# Patient Record
(demographics unavailable — no encounter records)

---

## 2025-06-06 NOTE — ASSESSMENT
[FreeTextEntry1] :   PE:   Constitutional: Alert and in no acute distress. Musculoskeletal: FCU tendonitis   Skin is intact. There is tenderness to palpation over the FCU.  There is no malrotation or scissoring of the injured digit. Remainder of the extremity is atraumatic. There is no tenderness over the volar tubercle of the scaphoid or anatomic snuffbox.   Mobility is full about the shoulders and elbows and wrists. Digital flexion and extension is limited on right index finger due to discomfort and swelling.   Sensation is intact to light touch in the ulnar, median, and radial nerve distributions. Motor is intact in the ulnar, median, and radial nerve distributions. Fingers are pink and well perfused. Capillary refill is brisk.   Data: 3 view xray of the # obtained and significant for #   Assessment: Patient is a # y/o # with #.   Procedure: Short arm fiberglass cast applied to the left wrist and hand. Procedure tolerated well.   Plan: - Follow up in 2 weeks for repeat x-rays - No strenuous activities or heavy lifting

## 2025-07-14 NOTE — HISTORY OF PRESENT ILLNESS
[FreeTextEntry1] : Patient presents today for evaluation of left wrist pain. She noticed it yesterday and denies any injures or inciting incidents. She localized the pain to the ulnar aspect of the wrist and states that the pain worsens with palpation, wrist flexion and extension and finger extension. She denies any N/T. She denies any other complaints.
[FreeTextEntry1] : Patient presents today for evaluation of left wrist pain. She noticed it yesterday and denies any injures or inciting incidents. She localized the pain to the ulnar aspect of the wrist and states that the pain worsens with palpation, wrist flexion and extension and finger extension. She denies any N/T. She denies any other complaints.
Christina Matta (Fellow)